# Patient Record
Sex: MALE | Race: WHITE | NOT HISPANIC OR LATINO
[De-identification: names, ages, dates, MRNs, and addresses within clinical notes are randomized per-mention and may not be internally consistent; named-entity substitution may affect disease eponyms.]

---

## 2019-09-10 PROBLEM — Z00.00 ENCOUNTER FOR PREVENTIVE HEALTH EXAMINATION: Status: ACTIVE | Noted: 2019-09-10

## 2019-09-20 ENCOUNTER — APPOINTMENT (OUTPATIENT)
Dept: UROLOGY | Facility: CLINIC | Age: 64
End: 2019-09-20
Payer: COMMERCIAL

## 2019-09-20 VITALS
WEIGHT: 200 LBS | DIASTOLIC BLOOD PRESSURE: 81 MMHG | HEIGHT: 70 IN | HEART RATE: 71 BPM | SYSTOLIC BLOOD PRESSURE: 130 MMHG | TEMPERATURE: 98 F | BODY MASS INDEX: 28.63 KG/M2

## 2019-09-20 DIAGNOSIS — N28.89 OTHER SPECIFIED DISORDERS OF KIDNEY AND URETER: ICD-10-CM

## 2019-09-20 PROCEDURE — 99204 OFFICE O/P NEW MOD 45 MIN: CPT

## 2019-09-20 NOTE — HISTORY OF PRESENT ILLNESS
[FreeTextEntry1] : CC: right renal mass\par \par Patient with gallstones, pain, led to ERcp and lap cholecystectomy, incidental right upper pole renal mass. Films reviewed on Quinten Mcnamara.   4.4 cm, upper pole, solid and enhancing.  RCC until proven otherwise.  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  \par \par SURG: lap jorge alberto 6 months ago\par ROS: negative 10 system \par FAMHX: negative RCC\par SOCIAL: cigars, banker

## 2019-09-20 NOTE — ASSESSMENT
[FreeTextEntry1] : We reviewed the possible underlying histology of solid enhancing renal masses, with the majority being malignant (~80%) whereas ~20% are benign (e.g. oncocytoma).  We discussed the role/possibility of percutaneous biopsy, with the associated risks, benefits, complications, and accuracy issues (e.g. risk of false negative results). \par \par The heterogeneous natural history/biology of renal cell carcinoma was discussed, including the fact that while many renal cancers are indolent, others behave aggresssively.  \par \par Options were reviewed including, not limited to, active surveillance (AS), surgical extirpation and ablation.  The risks of tumor growth and metastasis on active surveillance were reviewed, including the fact that metastatic progression on AS could mean missing the opportunity for cure.  The average growth rate of ~2-3 mm/year and metastasis rates of ~2-3% on AS over 5 year interval for small renal masses <4 cm was discussed. \par \par With respect to treatment we reviewed ablation (cryosurgery, radiofrequency ablation), risks of recurrence, opportunities for salvage treatment, and imaging requirements for follow up.  Oncologic outcomes for ablation were reviewed. \par \par With respect to surgery we reviewed nephron sparing surgery vs. radical nephrectomy, as well as open vs. minimally invasive surgical (MIS) approaches (e.g. laparoscopy and robotic assisted laparoscopic surgery).  Personal and institutional experience, as well as other published literature was reviewed.  Oncologic outcomes, renal functional outcomes were compared and contrasted between radical vs. NSS and open vs. MIS surgery.  Risks of acute kidney injury, medical/surgical chronic kidney disease (either exacerbation or new-onset), as well as risks of ESRD development were reviewed.   Risks of conversion from MIS to open surgery discussed.  Risks of converting from attempted partial nephrectomy to radical nephrectomy were discussed.  Risks of surgical complications were reviewed, including not limited to: bleeding//life-threatening hemorrhage, vascular/bowel/adjacent visceral organ injury, trocar/access injury, the possibility of recognized vs. unrecognized/delayed-recognition injury, risks of thermal/blunt/sharp/retraction injury, risks of DVT, PE, MI, death, risks of cardiopulmonary/anesthesia related complications, positional injury, infection/collection/abscess, wound complications/dehiscence/seroma/cellulitis, urinoma/fistula, ureteral injury/obstruction, as well as other complications\par \par Plan for robotic partial nephrectomy, possible radical possible open

## 2019-09-20 NOTE — PHYSICAL EXAM
[General Appearance - Well Developed] : well developed [General Appearance - Well Nourished] : well nourished [Normal Appearance] : normal appearance [Well Groomed] : well groomed [General Appearance - In No Acute Distress] : no acute distress [Edema] : no peripheral edema [] : no respiratory distress [Respiration, Rhythm And Depth] : normal respiratory rhythm and effort [Exaggerated Use Of Accessory Muscles For Inspiration] : no accessory muscle use [Abdomen Soft] : soft [Costovertebral Angle Tenderness] : no ~M costovertebral angle tenderness [Abdomen Tenderness] : non-tender [Penis Abnormality] : normal circumcised penis [Urethral Meatus] : meatus normal [Scrotum] : the scrotum was normal [Urinary Bladder Findings] : the bladder was normal on palpation [Epididymis] : the epididymides were normal [Testes Mass (___cm)] : there were no testicular masses [Testes Tenderness] : no tenderness of the testes [Normal Station and Gait] : the gait and station were normal for the patient's age [No Palpable Adenopathy] : no palpable adenopathy

## 2019-09-23 ENCOUNTER — TRANSCRIPTION ENCOUNTER (OUTPATIENT)
Age: 64
End: 2019-09-23

## 2019-11-05 ENCOUNTER — RECORD ABSTRACTING (OUTPATIENT)
Age: 64
End: 2019-11-05

## 2019-11-05 LAB
ALBUMIN SERPL ELPH-MCNC: 4.3 G/DL
ALP BLD-CCNC: 69 U/L
ALT SERPL-CCNC: 15 U/L
ANION GAP SERPL CALC-SCNC: 13 MMOL/L
AST SERPL-CCNC: 16 U/L
BILIRUB SERPL-MCNC: 0.4 MG/DL
BUN SERPL-MCNC: 16 MG/DL
CALCIUM SERPL-MCNC: 9.6 MG/DL
CHLORIDE SERPL-SCNC: 104 MMOL/L
CO2 SERPL-SCNC: 25 MMOL/L
CREAT SERPL-MCNC: 0.89 MG/DL
GLUCOSE SERPL-MCNC: 111 MG/DL
POTASSIUM SERPL-SCNC: 5.2 MMOL/L
PROT SERPL-MCNC: 6.5 G/DL
SODIUM SERPL-SCNC: 142 MMOL/L

## 2019-11-07 LAB
APPEARANCE: CLEAR
APTT BLD: 34 SEC
BACTERIA UR CULT: NORMAL
BACTERIA: NEGATIVE
BASOPHILS # BLD AUTO: 0.03 K/UL
BASOPHILS NFR BLD AUTO: 0.4 %
BILIRUBIN URINE: NEGATIVE
BLOOD URINE: NEGATIVE
COLOR: YELLOW
EOSINOPHIL # BLD AUTO: 0.1 K/UL
EOSINOPHIL NFR BLD AUTO: 1.4 %
GLUCOSE QUALITATIVE U: NEGATIVE
HCT VFR BLD CALC: 48.1 %
HGB BLD-MCNC: 15.2 G/DL
HYALINE CASTS: 1 /LPF
IMM GRANULOCYTES NFR BLD AUTO: 0.1 %
INR PPP: 0.97 RATIO
KETONES URINE: NEGATIVE
LEUKOCYTE ESTERASE URINE: NEGATIVE
LYMPHOCYTES # BLD AUTO: 2.39 K/UL
LYMPHOCYTES NFR BLD AUTO: 33.5 %
MAN DIFF?: NORMAL
MCHC RBC-ENTMCNC: 27.9 PG
MCHC RBC-ENTMCNC: 31.6 GM/DL
MCV RBC AUTO: 88.3 FL
MICROSCOPIC-UA: NORMAL
MONOCYTES # BLD AUTO: 0.52 K/UL
MONOCYTES NFR BLD AUTO: 7.3 %
NEUTROPHILS # BLD AUTO: 4.08 K/UL
NEUTROPHILS NFR BLD AUTO: 57.3 %
NITRITE URINE: NEGATIVE
PH URINE: 7
PLATELET # BLD AUTO: 208 K/UL
PROTEIN URINE: NEGATIVE
PT BLD: 11 SEC
RBC # BLD: 5.45 M/UL
RBC # FLD: 13.3 %
RED BLOOD CELLS URINE: 3 /HPF
SPECIFIC GRAVITY URINE: 1.02
SQUAMOUS EPITHELIAL CELLS: 1 /HPF
UROBILINOGEN URINE: NORMAL
WBC # FLD AUTO: 7.13 K/UL
WHITE BLOOD CELLS URINE: 1 /HPF

## 2019-11-18 ENCOUNTER — RESULT REVIEW (OUTPATIENT)
Age: 64
End: 2019-11-18

## 2019-11-18 ENCOUNTER — INPATIENT (INPATIENT)
Facility: HOSPITAL | Age: 64
LOS: 1 days | Discharge: HOME | DRG: 661 | End: 2019-11-20
Attending: UROLOGY | Admitting: UROLOGY
Payer: COMMERCIAL

## 2019-11-18 ENCOUNTER — APPOINTMENT (OUTPATIENT)
Dept: UROLOGY | Facility: HOSPITAL | Age: 64
End: 2019-11-18

## 2019-11-18 VITALS
WEIGHT: 210.54 LBS | HEIGHT: 70 IN | DIASTOLIC BLOOD PRESSURE: 92 MMHG | OXYGEN SATURATION: 95 % | HEART RATE: 77 BPM | RESPIRATION RATE: 16 BRPM | SYSTOLIC BLOOD PRESSURE: 154 MMHG | TEMPERATURE: 98 F

## 2019-11-18 DIAGNOSIS — I10 ESSENTIAL (PRIMARY) HYPERTENSION: ICD-10-CM

## 2019-11-18 DIAGNOSIS — Z98.890 OTHER SPECIFIED POSTPROCEDURAL STATES: Chronic | ICD-10-CM

## 2019-11-18 DIAGNOSIS — N28.89 OTHER SPECIFIED DISORDERS OF KIDNEY AND URETER: ICD-10-CM

## 2019-11-18 DIAGNOSIS — Z90.49 ACQUIRED ABSENCE OF OTHER SPECIFIED PARTS OF DIGESTIVE TRACT: Chronic | ICD-10-CM

## 2019-11-18 LAB
ANION GAP SERPL CALC-SCNC: 8 MMOL/L — SIGNIFICANT CHANGE UP (ref 5–17)
BASOPHILS # BLD AUTO: 0.03 K/UL — SIGNIFICANT CHANGE UP (ref 0–0.2)
BASOPHILS NFR BLD AUTO: 0.2 % — SIGNIFICANT CHANGE UP (ref 0–2)
BUN SERPL-MCNC: 20 MG/DL — SIGNIFICANT CHANGE UP (ref 7–23)
CALCIUM SERPL-MCNC: 8.9 MG/DL — SIGNIFICANT CHANGE UP (ref 8.4–10.5)
CHLORIDE SERPL-SCNC: 110 MMOL/L — HIGH (ref 96–108)
CO2 SERPL-SCNC: 24 MMOL/L — SIGNIFICANT CHANGE UP (ref 22–31)
CREAT SERPL-MCNC: 1 MG/DL — SIGNIFICANT CHANGE UP (ref 0.5–1.3)
EOSINOPHIL # BLD AUTO: 0.01 K/UL — SIGNIFICANT CHANGE UP (ref 0–0.5)
EOSINOPHIL NFR BLD AUTO: 0.1 % — SIGNIFICANT CHANGE UP (ref 0–6)
GLUCOSE SERPL-MCNC: 141 MG/DL — HIGH (ref 70–99)
HCT VFR BLD CALC: 39.2 % — SIGNIFICANT CHANGE UP (ref 39–50)
HGB BLD-MCNC: 12.5 G/DL — LOW (ref 13–17)
IMM GRANULOCYTES NFR BLD AUTO: 0.5 % — SIGNIFICANT CHANGE UP (ref 0–1.5)
LYMPHOCYTES # BLD AUTO: 0.84 K/UL — LOW (ref 1–3.3)
LYMPHOCYTES # BLD AUTO: 6.5 % — LOW (ref 13–44)
MCHC RBC-ENTMCNC: 27.5 PG — SIGNIFICANT CHANGE UP (ref 27–34)
MCHC RBC-ENTMCNC: 31.9 GM/DL — LOW (ref 32–36)
MCV RBC AUTO: 86.3 FL — SIGNIFICANT CHANGE UP (ref 80–100)
MONOCYTES # BLD AUTO: 0.61 K/UL — SIGNIFICANT CHANGE UP (ref 0–0.9)
MONOCYTES NFR BLD AUTO: 4.7 % — SIGNIFICANT CHANGE UP (ref 2–14)
NEUTROPHILS # BLD AUTO: 11.35 K/UL — HIGH (ref 1.8–7.4)
NEUTROPHILS NFR BLD AUTO: 88 % — HIGH (ref 43–77)
NRBC # BLD: 0 /100 WBCS — SIGNIFICANT CHANGE UP (ref 0–0)
PLATELET # BLD AUTO: 193 K/UL — SIGNIFICANT CHANGE UP (ref 150–400)
POTASSIUM SERPL-MCNC: 4.4 MMOL/L — SIGNIFICANT CHANGE UP (ref 3.5–5.3)
POTASSIUM SERPL-SCNC: 4.4 MMOL/L — SIGNIFICANT CHANGE UP (ref 3.5–5.3)
RBC # BLD: 4.54 M/UL — SIGNIFICANT CHANGE UP (ref 4.2–5.8)
RBC # FLD: 12.9 % — SIGNIFICANT CHANGE UP (ref 10.3–14.5)
SODIUM SERPL-SCNC: 142 MMOL/L — SIGNIFICANT CHANGE UP (ref 135–145)
WBC # BLD: 12.9 K/UL — HIGH (ref 3.8–10.5)
WBC # FLD AUTO: 12.9 K/UL — HIGH (ref 3.8–10.5)

## 2019-11-18 PROCEDURE — 50543 LAPARO PARTIAL NEPHRECTOMY: CPT | Mod: RT

## 2019-11-18 PROCEDURE — 71045 X-RAY EXAM CHEST 1 VIEW: CPT | Mod: 26

## 2019-11-18 PROCEDURE — S2900 ROBOTIC SURGICAL SYSTEM: CPT

## 2019-11-18 PROCEDURE — 76998 US GUIDE INTRAOP: CPT | Mod: 26

## 2019-11-18 RX ORDER — GABAPENTIN 400 MG/1
600 CAPSULE ORAL ONCE
Refills: 0 | Status: COMPLETED | OUTPATIENT
Start: 2019-11-18 | End: 2019-11-18

## 2019-11-18 RX ORDER — SENNA PLUS 8.6 MG/1
2 TABLET ORAL AT BEDTIME
Refills: 0 | Status: DISCONTINUED | OUTPATIENT
Start: 2019-11-18 | End: 2019-11-20

## 2019-11-18 RX ORDER — ONDANSETRON 8 MG/1
4 TABLET, FILM COATED ORAL EVERY 6 HOURS
Refills: 0 | Status: DISCONTINUED | OUTPATIENT
Start: 2019-11-18 | End: 2019-11-20

## 2019-11-18 RX ORDER — KETOROLAC TROMETHAMINE 30 MG/ML
15 SYRINGE (ML) INJECTION EVERY 6 HOURS
Refills: 0 | Status: DISCONTINUED | OUTPATIENT
Start: 2019-11-18 | End: 2019-11-20

## 2019-11-18 RX ORDER — OXYCODONE HYDROCHLORIDE 5 MG/1
5 TABLET ORAL EVERY 4 HOURS
Refills: 0 | Status: DISCONTINUED | OUTPATIENT
Start: 2019-11-18 | End: 2019-11-20

## 2019-11-18 RX ORDER — OXYCODONE HYDROCHLORIDE 5 MG/1
10 TABLET ORAL EVERY 4 HOURS
Refills: 0 | Status: DISCONTINUED | OUTPATIENT
Start: 2019-11-18 | End: 2019-11-20

## 2019-11-18 RX ORDER — ACETAMINOPHEN 500 MG
1000 TABLET ORAL ONCE
Refills: 0 | Status: COMPLETED | OUTPATIENT
Start: 2019-11-18 | End: 2019-11-18

## 2019-11-18 RX ORDER — AMLODIPINE BESYLATE 2.5 MG/1
1 TABLET ORAL
Qty: 0 | Refills: 0 | DISCHARGE

## 2019-11-18 RX ORDER — HYDROMORPHONE HYDROCHLORIDE 2 MG/ML
0.5 INJECTION INTRAMUSCULAR; INTRAVENOUS; SUBCUTANEOUS
Refills: 0 | Status: DISCONTINUED | OUTPATIENT
Start: 2019-11-18 | End: 2019-11-20

## 2019-11-18 RX ORDER — BUPIVACAINE 13.3 MG/ML
20 INJECTION, SUSPENSION, LIPOSOMAL INFILTRATION ONCE
Refills: 0 | Status: DISCONTINUED | OUTPATIENT
Start: 2019-11-18 | End: 2019-11-20

## 2019-11-18 RX ORDER — ACETAMINOPHEN 500 MG
1000 TABLET ORAL EVERY 6 HOURS
Refills: 0 | Status: DISCONTINUED | OUTPATIENT
Start: 2019-11-18 | End: 2019-11-20

## 2019-11-18 RX ORDER — CEFAZOLIN SODIUM 1 G
1000 VIAL (EA) INJECTION EVERY 8 HOURS
Refills: 0 | Status: COMPLETED | OUTPATIENT
Start: 2019-11-18 | End: 2019-11-19

## 2019-11-18 RX ORDER — LOSARTAN POTASSIUM 100 MG/1
25 TABLET, FILM COATED ORAL DAILY
Refills: 0 | Status: DISCONTINUED | OUTPATIENT
Start: 2019-11-18 | End: 2019-11-20

## 2019-11-18 RX ORDER — METOCLOPRAMIDE HCL 10 MG
10 TABLET ORAL ONCE
Refills: 0 | Status: DISCONTINUED | OUTPATIENT
Start: 2019-11-18 | End: 2019-11-20

## 2019-11-18 RX ORDER — ENOXAPARIN SODIUM 100 MG/ML
40 INJECTION SUBCUTANEOUS ONCE
Refills: 0 | Status: COMPLETED | OUTPATIENT
Start: 2019-11-18 | End: 2019-11-18

## 2019-11-18 RX ORDER — SODIUM CHLORIDE 9 MG/ML
1000 INJECTION, SOLUTION INTRAVENOUS
Refills: 0 | Status: DISCONTINUED | OUTPATIENT
Start: 2019-11-18 | End: 2019-11-19

## 2019-11-18 RX ORDER — ENOXAPARIN SODIUM 100 MG/ML
40 INJECTION SUBCUTANEOUS DAILY
Refills: 0 | Status: DISCONTINUED | OUTPATIENT
Start: 2019-11-18 | End: 2019-11-20

## 2019-11-18 RX ORDER — LOSARTAN POTASSIUM 100 MG/1
1 TABLET, FILM COATED ORAL
Qty: 0 | Refills: 0 | DISCHARGE

## 2019-11-18 RX ADMIN — Medication 15 MILLIGRAM(S): at 19:50

## 2019-11-18 RX ADMIN — Medication 15 MILLIGRAM(S): at 20:50

## 2019-11-18 RX ADMIN — Medication 1000 MILLIGRAM(S): at 07:37

## 2019-11-18 RX ADMIN — LOSARTAN POTASSIUM 25 MILLIGRAM(S): 100 TABLET, FILM COATED ORAL at 17:02

## 2019-11-18 RX ADMIN — SODIUM CHLORIDE 75 MILLILITER(S): 9 INJECTION, SOLUTION INTRAVENOUS at 17:02

## 2019-11-18 RX ADMIN — Medication 1000 MILLIGRAM(S): at 20:50

## 2019-11-18 RX ADMIN — GABAPENTIN 600 MILLIGRAM(S): 400 CAPSULE ORAL at 07:38

## 2019-11-18 RX ADMIN — Medication 1000 MILLIGRAM(S): at 19:50

## 2019-11-18 RX ADMIN — Medication 100 MILLIGRAM(S): at 17:02

## 2019-11-18 RX ADMIN — ENOXAPARIN SODIUM 40 MILLIGRAM(S): 100 INJECTION SUBCUTANEOUS at 07:37

## 2019-11-18 NOTE — PACU DISCHARGE NOTE - COMMENTS
axo3 denies pain Lap sites intact Right abd artem draining small amount of serosanginous drainage navarro to gravity draining patrick urine report given to unit nurse for transfer in bed to room 545/1

## 2019-11-18 NOTE — PROGRESS NOTE ADULT - PROBLEM SELECTOR PLAN 1
-stable  -OOB  -IS, SCD's  -Diet: clears  -Antibx: ancef x 3  -I's & O's  -pain control  -IVF's  -continue melanie & JIMENA

## 2019-11-18 NOTE — PROGRESS NOTE ADULT - SUBJECTIVE AND OBJECTIVE BOX
UROLOGY POST OP NOTE (PAGER # [phone])    PROCEDURE: Robotic assisted Right nephrectomy    T(C): 36.8 (11-18-19 @ 15:51), Max: 37.4 (11-18-19 @ 13:26)  HR: 86 (11-18-19 @ 15:51) (77 - 88)  BP: 161/93 (11-18-19 @ 15:51) (140/84 - 161/93)  RR: 18 (11-18-19 @ 15:51) (10 - 20)  SpO2: 94% (11-18-19 @ 15:51) (94% - 98%)  Wt(kg): --   cc  JIMENA output: 45 cc serosanguinous    SUBJECTIVE: pain controlled. No N/V, No SOB/CP.    ON PE: alert and awake    Abdomen: soft, incision sites clean and dry, sl TTP, JIMENA intact serosanguinous    : FC intact, urine clear                          12.5   12.90 )-----------( 193      ( 18 Nov 2019 13:43 )             39.2     11-18    142  |  110<H>  |  20  ----------------------------<  141<H>  4.4   |  24  |  1.00    Ca    8.9      18 Nov 2019 13:43

## 2019-11-19 LAB
ANION GAP SERPL CALC-SCNC: 10 MMOL/L — SIGNIFICANT CHANGE UP (ref 5–17)
BUN SERPL-MCNC: 20 MG/DL — SIGNIFICANT CHANGE UP (ref 7–23)
CALCIUM SERPL-MCNC: 8.1 MG/DL — LOW (ref 8.4–10.5)
CHLORIDE SERPL-SCNC: 107 MMOL/L — SIGNIFICANT CHANGE UP (ref 96–108)
CO2 SERPL-SCNC: 23 MMOL/L — SIGNIFICANT CHANGE UP (ref 22–31)
CREAT FLD-MCNC: 1.37 MG/DL — SIGNIFICANT CHANGE UP
CREAT SERPL-MCNC: 1.44 MG/DL — HIGH (ref 0.5–1.3)
GLUCOSE SERPL-MCNC: 106 MG/DL — HIGH (ref 70–99)
HCT VFR BLD CALC: 36.7 % — LOW (ref 39–50)
HGB BLD-MCNC: 11.6 G/DL — LOW (ref 13–17)
MAGNESIUM SERPL-MCNC: 1.7 MG/DL — SIGNIFICANT CHANGE UP (ref 1.6–2.6)
MCHC RBC-ENTMCNC: 28 PG — SIGNIFICANT CHANGE UP (ref 27–34)
MCHC RBC-ENTMCNC: 31.6 GM/DL — LOW (ref 32–36)
MCV RBC AUTO: 88.4 FL — SIGNIFICANT CHANGE UP (ref 80–100)
NRBC # BLD: 0 /100 WBCS — SIGNIFICANT CHANGE UP (ref 0–0)
PHOSPHATE SERPL-MCNC: 3.9 MG/DL — SIGNIFICANT CHANGE UP (ref 2.5–4.5)
PLATELET # BLD AUTO: 185 K/UL — SIGNIFICANT CHANGE UP (ref 150–400)
POTASSIUM SERPL-MCNC: 4.3 MMOL/L — SIGNIFICANT CHANGE UP (ref 3.5–5.3)
POTASSIUM SERPL-SCNC: 4.3 MMOL/L — SIGNIFICANT CHANGE UP (ref 3.5–5.3)
RBC # BLD: 4.15 M/UL — LOW (ref 4.2–5.8)
RBC # FLD: 12.8 % — SIGNIFICANT CHANGE UP (ref 10.3–14.5)
SODIUM SERPL-SCNC: 140 MMOL/L — SIGNIFICANT CHANGE UP (ref 135–145)
WBC # BLD: 10.33 K/UL — SIGNIFICANT CHANGE UP (ref 3.8–10.5)
WBC # FLD AUTO: 10.33 K/UL — SIGNIFICANT CHANGE UP (ref 3.8–10.5)

## 2019-11-19 RX ORDER — MAGNESIUM OXIDE 400 MG ORAL TABLET 241.3 MG
400 TABLET ORAL ONCE
Refills: 0 | Status: COMPLETED | OUTPATIENT
Start: 2019-11-19 | End: 2019-11-19

## 2019-11-19 RX ADMIN — Medication 15 MILLIGRAM(S): at 14:22

## 2019-11-19 RX ADMIN — Medication 100 MILLIGRAM(S): at 02:09

## 2019-11-19 RX ADMIN — Medication 1000 MILLIGRAM(S): at 13:14

## 2019-11-19 RX ADMIN — Medication 15 MILLIGRAM(S): at 02:10

## 2019-11-19 RX ADMIN — Medication 1000 MILLIGRAM(S): at 08:50

## 2019-11-19 RX ADMIN — Medication 15 MILLIGRAM(S): at 19:00

## 2019-11-19 RX ADMIN — Medication 15 MILLIGRAM(S): at 07:47

## 2019-11-19 RX ADMIN — Medication 100 MILLIGRAM(S): at 10:13

## 2019-11-19 RX ADMIN — Medication 1000 MILLIGRAM(S): at 03:10

## 2019-11-19 RX ADMIN — Medication 1000 MILLIGRAM(S): at 07:47

## 2019-11-19 RX ADMIN — Medication 1000 MILLIGRAM(S): at 14:22

## 2019-11-19 RX ADMIN — Medication 15 MILLIGRAM(S): at 13:14

## 2019-11-19 RX ADMIN — MAGNESIUM OXIDE 400 MG ORAL TABLET 400 MILLIGRAM(S): 241.3 TABLET ORAL at 08:50

## 2019-11-19 RX ADMIN — Medication 1000 MILLIGRAM(S): at 02:10

## 2019-11-19 RX ADMIN — Medication 1000 MILLIGRAM(S): at 20:00

## 2019-11-19 RX ADMIN — ENOXAPARIN SODIUM 40 MILLIGRAM(S): 100 INJECTION SUBCUTANEOUS at 06:29

## 2019-11-19 RX ADMIN — Medication 1000 MILLIGRAM(S): at 19:00

## 2019-11-19 RX ADMIN — Medication 15 MILLIGRAM(S): at 08:50

## 2019-11-19 RX ADMIN — LOSARTAN POTASSIUM 25 MILLIGRAM(S): 100 TABLET, FILM COATED ORAL at 06:29

## 2019-11-19 RX ADMIN — Medication 15 MILLIGRAM(S): at 20:00

## 2019-11-19 RX ADMIN — Medication 15 MILLIGRAM(S): at 02:25

## 2019-11-19 NOTE — PROGRESS NOTE ADULT - SUBJECTIVE AND OBJECTIVE BOX
INTERVAL HPI/OVERNIGHT EVENTS:  No acute events overnight.    VITALS:    T(F): 98.1 (11-19-19 @ 00:36), Max: 99.3 (11-18-19 @ 13:26)  HR: 78 (11-19-19 @ 00:36) (77 - 88)  BP: 147/89 (11-19-19 @ 00:36) (140/84 - 161/93)  RR: 18 (11-19-19 @ 00:36) (10 - 20)  SpO2: 95% (11-19-19 @ 00:36) (94% - 98%)  Wt(kg): --    I&O's Detail    18 Nov 2019 07:01  -  19 Nov 2019 04:56  --------------------------------------------------------  IN:    lactated ringers.: 1125 mL  Total IN: 1125 mL    OUT:    Bulb: 215 mL    Indwelling Catheter - Urethral: 1550 mL  Total OUT: 1765 mL    Total NET: -640 mL          MEDICATIONS:    ANTIBIOTICS:  ceFAZolin   IVPB 1000 milliGRAM(s) IV Intermittent every 8 hours      PAIN CONTROL:  acetaminophen   Tablet .. 1000 milliGRAM(s) Oral every 6 hours  HYDROmorphone  Injectable 0.5 milliGRAM(s) IV Push every 3 hours PRN  HYDROmorphone  Injectable 0.5 milliGRAM(s) IV Push every 10 minutes PRN  ketorolac   Injectable 15 milliGRAM(s) IV Push every 6 hours  metoclopramide Injectable 10 milliGRAM(s) IV Push once PRN  ondansetron Injectable 4 milliGRAM(s) IV Push every 6 hours PRN  oxyCODONE    IR 5 milliGRAM(s) Oral every 4 hours PRN  oxyCODONE    IR 10 milliGRAM(s) Oral every 4 hours PRN       MEDS:      HEME/ONC  enoxaparin Injectable 40 milliGRAM(s) SubCutaneous daily        PHYSICAL EXAM:  General: No acute distress.  Alert and Oriented  Abdominal Exam: soft, incision sites clean and dry, JIMENA intact serosanguinous   Exam: FC intact, urine clear      LABS:                        12.5   12.90 )-----------( 193      ( 18 Nov 2019 13:43 )             39.2     11-18    142  |  110<H>  |  20  ----------------------------<  141<H>  4.4   |  24  |  1.00    Ca    8.9      18 Nov 2019 13:43            RADIOLOGY & ADDITIONAL TESTS:

## 2019-11-20 ENCOUNTER — TRANSCRIPTION ENCOUNTER (OUTPATIENT)
Age: 64
End: 2019-11-20

## 2019-11-20 VITALS
DIASTOLIC BLOOD PRESSURE: 87 MMHG | OXYGEN SATURATION: 96 % | SYSTOLIC BLOOD PRESSURE: 142 MMHG | HEART RATE: 81 BPM | TEMPERATURE: 98 F | RESPIRATION RATE: 17 BRPM

## 2019-11-20 PROBLEM — N28.89 OTHER SPECIFIED DISORDERS OF KIDNEY AND URETER: Chronic | Status: ACTIVE | Noted: 2019-11-18

## 2019-11-20 PROBLEM — I10 ESSENTIAL (PRIMARY) HYPERTENSION: Chronic | Status: ACTIVE | Noted: 2019-11-18

## 2019-11-20 LAB
ANION GAP SERPL CALC-SCNC: 9 MMOL/L — SIGNIFICANT CHANGE UP (ref 5–17)
BUN SERPL-MCNC: 22 MG/DL — SIGNIFICANT CHANGE UP (ref 7–23)
CALCIUM SERPL-MCNC: 8.4 MG/DL — SIGNIFICANT CHANGE UP (ref 8.4–10.5)
CHLORIDE SERPL-SCNC: 108 MMOL/L — SIGNIFICANT CHANGE UP (ref 96–108)
CO2 SERPL-SCNC: 25 MMOL/L — SIGNIFICANT CHANGE UP (ref 22–31)
CREAT SERPL-MCNC: 1.44 MG/DL — HIGH (ref 0.5–1.3)
GLUCOSE SERPL-MCNC: 98 MG/DL — SIGNIFICANT CHANGE UP (ref 70–99)
HCT VFR BLD CALC: 34.9 % — LOW (ref 39–50)
HGB BLD-MCNC: 11.4 G/DL — LOW (ref 13–17)
MAGNESIUM SERPL-MCNC: 2 MG/DL — SIGNIFICANT CHANGE UP (ref 1.6–2.6)
MCHC RBC-ENTMCNC: 28 PG — SIGNIFICANT CHANGE UP (ref 27–34)
MCHC RBC-ENTMCNC: 32.7 GM/DL — SIGNIFICANT CHANGE UP (ref 32–36)
MCV RBC AUTO: 85.7 FL — SIGNIFICANT CHANGE UP (ref 80–100)
NRBC # BLD: 0 /100 WBCS — SIGNIFICANT CHANGE UP (ref 0–0)
PHOSPHATE SERPL-MCNC: 2.6 MG/DL — SIGNIFICANT CHANGE UP (ref 2.5–4.5)
PLATELET # BLD AUTO: 178 K/UL — SIGNIFICANT CHANGE UP (ref 150–400)
POTASSIUM SERPL-MCNC: 4.7 MMOL/L — SIGNIFICANT CHANGE UP (ref 3.5–5.3)
POTASSIUM SERPL-SCNC: 4.7 MMOL/L — SIGNIFICANT CHANGE UP (ref 3.5–5.3)
RBC # BLD: 4.07 M/UL — LOW (ref 4.2–5.8)
RBC # FLD: 12.8 % — SIGNIFICANT CHANGE UP (ref 10.3–14.5)
SODIUM SERPL-SCNC: 142 MMOL/L — SIGNIFICANT CHANGE UP (ref 135–145)
WBC # BLD: 8.94 K/UL — SIGNIFICANT CHANGE UP (ref 3.8–10.5)
WBC # FLD AUTO: 8.94 K/UL — SIGNIFICANT CHANGE UP (ref 3.8–10.5)

## 2019-11-20 RX ORDER — DOCUSATE SODIUM 100 MG
1 CAPSULE ORAL
Qty: 60 | Refills: 0
Start: 2019-11-20 | End: 2019-12-19

## 2019-11-20 RX ADMIN — Medication 1000 MILLIGRAM(S): at 00:44

## 2019-11-20 RX ADMIN — Medication 15 MILLIGRAM(S): at 06:16

## 2019-11-20 RX ADMIN — Medication 1000 MILLIGRAM(S): at 01:44

## 2019-11-20 RX ADMIN — Medication 15 MILLIGRAM(S): at 01:00

## 2019-11-20 RX ADMIN — Medication 1000 MILLIGRAM(S): at 07:01

## 2019-11-20 RX ADMIN — LOSARTAN POTASSIUM 25 MILLIGRAM(S): 100 TABLET, FILM COATED ORAL at 06:00

## 2019-11-20 RX ADMIN — Medication 15 MILLIGRAM(S): at 00:45

## 2019-11-20 RX ADMIN — Medication 1000 MILLIGRAM(S): at 06:01

## 2019-11-20 RX ADMIN — Medication 15 MILLIGRAM(S): at 06:01

## 2019-11-20 NOTE — DISCHARGE NOTE PROVIDER - NSDCCPCAREPLAN_GEN_ALL_CORE_FT
PRINCIPAL DISCHARGE DIAGNOSIS  Diagnosis: Renal mass, right  Assessment and Plan of Treatment: Call MD for increase abdominal pain, nausea, vomiting, temperature >101.4F, or for difficulty voiding.  Follow up with Dr. Downing in one week.  Resume home medications.

## 2019-11-20 NOTE — PROGRESS NOTE ADULT - ASSESSMENT
65 yo male s/p robotic partial R nephrectomy,    Plan: -stable  -OOB  -SCD's, IS  -f/u labs  -continue present care

## 2019-11-20 NOTE — DISCHARGE NOTE PROVIDER - NSDCMRMEDTOKEN_GEN_ALL_CORE_FT
Colace 100 mg oral capsule: 1 cap(s) orally 2 times a day MDD:2  losartan 25 mg oral tablet: 1 tab(s) orally once a day  Percocet 5/325 oral tablet: 1 tab(s) orally every 6 hours, As Needed -for moderate pain MDD:4

## 2019-11-20 NOTE — DISCHARGE NOTE PROVIDER - CARE PROVIDER_API CALL
Luis Downing)  Urology  [address] [address]  Phone: [phone]  Fax: [phone]  Follow Up Time:

## 2019-11-20 NOTE — PROGRESS NOTE ADULT - SUBJECTIVE AND OBJECTIVE BOX
INTERVAL HPI/OVERNIGHT EVENTS:  No acute events overnight.    VITALS:    T(F): 98 (11-20-19 @ 05:56), Max: 98.5 (11-20-19 @ 01:00)  HR: 91 (11-20-19 @ 05:56) (70 - 91)  BP: 154/90 (11-20-19 @ 05:56) (110/71 - 155/86)  RR: 16 (11-20-19 @ 05:56) (16 - 17)  SpO2: 97% (11-20-19 @ 05:56) (94% - 97%)  Wt(kg): --    I&O's Detail    19 Nov 2019 07:01  -  20 Nov 2019 07:00  --------------------------------------------------------  IN:    lactated ringers.: 225 mL  Total IN: 225 mL    OUT:    Bulb: 147.5 mL    Indwelling Catheter - Urethral: 275 mL    Voided: 500 mL  Total OUT: 922.5 mL    Total NET: -697.5 mL          MEDICATIONS:    ANTIBIOTICS:      PAIN CONTROL:  acetaminophen   Tablet .. 1000 milliGRAM(s) Oral every 6 hours  HYDROmorphone  Injectable 0.5 milliGRAM(s) IV Push every 3 hours PRN  HYDROmorphone  Injectable 0.5 milliGRAM(s) IV Push every 10 minutes PRN  ketorolac   Injectable 15 milliGRAM(s) IV Push every 6 hours  metoclopramide Injectable 10 milliGRAM(s) IV Push once PRN  ondansetron Injectable 4 milliGRAM(s) IV Push every 6 hours PRN  oxyCODONE    IR 5 milliGRAM(s) Oral every 4 hours PRN  oxyCODONE    IR 10 milliGRAM(s) Oral every 4 hours PRN       MEDS:      HEME/ONC  enoxaparin Injectable 40 milliGRAM(s) SubCutaneous daily        PHYSICAL EXAM:  General: No acute distress.  Alert and Oriented  Abdominal Exam: soft, incision sites clean and dry, JIMENA intact serosanguinous   Exam: WNL, urine clear      LABS:                        11.4   8.94  )-----------( 178      ( 20 Nov 2019 06:09 )             34.9     11-20    142  |  108  |  22  ----------------------------<  98  4.7   |  25  |  1.44<H>    Ca    8.4      20 Nov 2019 06:09  Phos  2.6     11-20  Mg     2.0     11-20            RADIOLOGY & ADDITIONAL TESTS:

## 2019-11-20 NOTE — DISCHARGE NOTE PROVIDER - NSDCACTIVITY_GEN_ALL_CORE
No heavy lifting/straining/Stairs allowed/Walking - Outdoors allowed/Walking - Indoors allowed/Showering allowed

## 2019-11-20 NOTE — DISCHARGE NOTE PROVIDER - HOSPITAL COURSE
65 yo male with R kidney mass s/p OR 11/18 robotic assisted R partial nephrectomy. Tolerated procedure well. Uneventful post op course.    Tolerates PO. Voiding well. JIMENA drain removed prior to d/c. F/U as outpt.

## 2019-11-26 ENCOUNTER — APPOINTMENT (OUTPATIENT)
Dept: UROLOGY | Facility: CLINIC | Age: 64
End: 2019-11-26
Payer: COMMERCIAL

## 2019-11-26 LAB
ANION GAP SERPL CALC-SCNC: 9 MMOL/L
BUN SERPL-MCNC: 14 MG/DL
CALCIUM SERPL-MCNC: 9 MG/DL
CHLORIDE SERPL-SCNC: 103 MMOL/L
CO2 SERPL-SCNC: 28 MMOL/L
CREAT SERPL-MCNC: 1.37 MG/DL
GLUCOSE SERPL-MCNC: 96 MG/DL
POTASSIUM SERPL-SCNC: 5 MMOL/L
SODIUM SERPL-SCNC: 140 MMOL/L

## 2019-11-26 PROCEDURE — 99024 POSTOP FOLLOW-UP VISIT: CPT

## 2019-11-26 NOTE — HISTORY OF PRESENT ILLNESS
[FreeTextEntry1] : Patient returns s/p partial nephrectomy and is doing well after partial nephrectomy\par \par Patient without complaints; specifically denies nausea, vomiting, fever. \par  \par Tolerating a regular diet, passing gas and BM\par \par Urine clear and yellow. \par \par No incisional complaints.  He only states that the first post-operative days it was difficult to cough or sneeze due to some incisional pain. He is back to work.  We operated last Monday, discharged Wednesday, and back to work Friday.  He works in banking. \par \par No leg swelling or calf pain, no shortness of breath, pleuritic pain, or chest pain. \par \par Pathology pending; oncocytoma, chromophobe, vs other pathology.  Special stains pending.  Dr. Ibrahim believes margin negative but awaiting final pathology. .   \par \par Plan for follow up imaging per protocol.\par

## 2019-11-27 LAB — SURGICAL PATHOLOGY STUDY: SIGNIFICANT CHANGE UP

## 2019-12-03 PROCEDURE — 80048 BASIC METABOLIC PNL TOTAL CA: CPT

## 2019-12-03 PROCEDURE — 88331 PATH CONSLTJ SURG 1 BLK 1SPC: CPT

## 2019-12-03 PROCEDURE — 85025 COMPLETE CBC W/AUTO DIFF WBC: CPT

## 2019-12-03 PROCEDURE — 84100 ASSAY OF PHOSPHORUS: CPT

## 2019-12-03 PROCEDURE — 36415 COLL VENOUS BLD VENIPUNCTURE: CPT

## 2019-12-03 PROCEDURE — 85027 COMPLETE CBC AUTOMATED: CPT

## 2019-12-03 PROCEDURE — 71045 X-RAY EXAM CHEST 1 VIEW: CPT

## 2019-12-03 PROCEDURE — S2900: CPT

## 2019-12-03 PROCEDURE — 88307 TISSUE EXAM BY PATHOLOGIST: CPT

## 2019-12-03 PROCEDURE — 82570 ASSAY OF URINE CREATININE: CPT

## 2019-12-03 PROCEDURE — 88305 TISSUE EXAM BY PATHOLOGIST: CPT

## 2019-12-03 PROCEDURE — 86923 COMPATIBILITY TEST ELECTRIC: CPT

## 2019-12-03 PROCEDURE — 86901 BLOOD TYPING SEROLOGIC RH(D): CPT

## 2019-12-03 PROCEDURE — 88341 IMHCHEM/IMCYTCHM EA ADD ANTB: CPT

## 2019-12-03 PROCEDURE — 83735 ASSAY OF MAGNESIUM: CPT

## 2019-12-03 PROCEDURE — 86900 BLOOD TYPING SEROLOGIC ABO: CPT

## 2019-12-03 PROCEDURE — 86850 RBC ANTIBODY SCREEN: CPT

## 2020-05-10 ENCOUNTER — TRANSCRIPTION ENCOUNTER (OUTPATIENT)
Age: 65
End: 2020-05-10

## 2020-07-20 ENCOUNTER — TRANSCRIPTION ENCOUNTER (OUTPATIENT)
Age: 65
End: 2020-07-20

## 2020-08-25 ENCOUNTER — APPOINTMENT (OUTPATIENT)
Dept: UROLOGY | Facility: CLINIC | Age: 65
End: 2020-08-25
Payer: COMMERCIAL

## 2020-08-25 VITALS
BODY MASS INDEX: 29.78 KG/M2 | SYSTOLIC BLOOD PRESSURE: 138 MMHG | DIASTOLIC BLOOD PRESSURE: 95 MMHG | TEMPERATURE: 98.1 F | OXYGEN SATURATION: 98 % | WEIGHT: 208 LBS | HEIGHT: 70 IN | HEART RATE: 88 BPM

## 2020-08-25 PROCEDURE — 99213 OFFICE O/P EST LOW 20 MIN: CPT

## 2020-08-25 NOTE — ASSESSMENT
[FreeTextEntry1] : Benign renal mass s/p excision\par MRI benign no concerning lesions\par No further follow up needed

## 2020-08-25 NOTE — PHYSICAL EXAM
[General Appearance - Well Developed] : well developed [General Appearance - Well Nourished] : well nourished [Well Groomed] : well groomed [Normal Appearance] : normal appearance [General Appearance - In No Acute Distress] : no acute distress [Abdomen Soft] : soft [Abdomen Tenderness] : non-tender [Urethral Meatus] : meatus normal [Costovertebral Angle Tenderness] : no ~M costovertebral angle tenderness [Urinary Bladder Findings] : the bladder was normal on palpation [Scrotum] : the scrotum was normal [Testes Mass (___cm)] : there were no testicular masses [] : no respiratory distress [No Prostate Nodules] : no prostate nodules [Edema] : no peripheral edema [Respiration, Rhythm And Depth] : normal respiratory rhythm and effort [Exaggerated Use Of Accessory Muscles For Inspiration] : no accessory muscle use

## 2020-08-25 NOTE — HISTORY OF PRESENT ILLNESS
[FreeTextEntry1] : CC: Benign renal mass\par \par Patient s/p partial nephrectomy, doing well\par Oncocytoma\par No complaints\par WIll obtain PSA screening with PMD\par No other issues. \par \par SURG: lap jorge alberto, partial nephrectomy \par ROS: negative 10 system \par FAMHX: negative RCC\par SOCIAL: cigars, banker \par

## 2021-01-14 ENCOUNTER — TRANSCRIPTION ENCOUNTER (OUTPATIENT)
Age: 66
End: 2021-01-14

## 2021-01-17 ENCOUNTER — OUTPATIENT (OUTPATIENT)
Dept: INPATIENT UNIT | Facility: HOSPITAL | Age: 66
LOS: 1 days | End: 2021-01-17
Payer: COMMERCIAL

## 2021-01-17 ENCOUNTER — APPOINTMENT (OUTPATIENT)
Dept: DISASTER EMERGENCY | Facility: HOSPITAL | Age: 66
End: 2021-01-17

## 2021-01-17 VITALS
OXYGEN SATURATION: 97 % | DIASTOLIC BLOOD PRESSURE: 92 MMHG | HEART RATE: 93 BPM | TEMPERATURE: 98 F | SYSTOLIC BLOOD PRESSURE: 131 MMHG | RESPIRATION RATE: 18 BRPM

## 2021-01-17 VITALS
HEART RATE: 82 BPM | TEMPERATURE: 98 F | DIASTOLIC BLOOD PRESSURE: 85 MMHG | RESPIRATION RATE: 19 BRPM | SYSTOLIC BLOOD PRESSURE: 119 MMHG | OXYGEN SATURATION: 98 %

## 2021-01-17 DIAGNOSIS — Z98.890 OTHER SPECIFIED POSTPROCEDURAL STATES: Chronic | ICD-10-CM

## 2021-01-17 DIAGNOSIS — Z90.49 ACQUIRED ABSENCE OF OTHER SPECIFIED PARTS OF DIGESTIVE TRACT: Chronic | ICD-10-CM

## 2021-01-17 DIAGNOSIS — U07.1 COVID-19: ICD-10-CM

## 2021-01-17 PROCEDURE — M0243: CPT

## 2021-01-17 NOTE — CHART NOTE - NSCHARTNOTEFT_GEN_A_CORE
CC: Monoclonal Antibody Infusion/COVID 19 Positive  65y Male    exam/findings:  T(C): --  HR: --  BP: --  RR: --  SpO2: --      PE:   Appearance: NAD	  HEENT:   Normal oral mucosa,   Lymphatic: No lymphadenopathy  Cardiovascular: Normal S1 S2, No JVD, No murmurs, No edema  Respiratory: Lungs clear to auscultation	  Gastrointestinal:  Soft, Non-tender, + BS	  Skin: warm and dry, no rash or urticaria   Neurologic: Non-focal  Extremities: Normal range of motion,    ASSESSMENT:  Pt is a 64 yo male with a PMHx of HTN   Covid + 1/13/2021 referred by Carolann Null who presents to infusion center for Monoclonal antibody infusion (Regeneron).    Symptoms/ Criteria: Cough, Congestion  Risk Profile includes: >64 yo    PLAN:  - infusion procedure explained to patient I have reviewed the Regeneron Emergency Use Authorization (EUA) and I have provided the patient or patient's caregiver with the following information:      1. FDA has authorized emergency use of Regeneron( Casirivimab and Imdevimab), which is not an FDA-approved biological product.  2. The patient or patient's caregiver has the option to accept or refuse administration of Regeneron.  3. The significant known and potential risks and benefits of Regeneron and the extent to which such risks and benefits are unknown.  4. Information on available alternative treatments were discussed and recommended to visit https://www.ebhwq79qjyokccpeatiefhkhkf.nih.gov/ for further understanding    - Consent for monoclonal antibody infusion obtained   - Risk & benefits discussed/all questions answered  - infuse Regeneron (casirivimab 1200mg & imdevimab 1200mg) IV over one hour  - observe patient for one hour post infusion, and then if stable discharge home with oupt follow up as planned by PMD. CC: Monoclonal Antibody Infusion/COVID 19 Positive  65y Male    Vital signs  BP- 131/92  HR- 93  TEMP-98.3  RR-18  SPO2-97%    PE:   Appearance: NAD	  HEENT:   Normal oral mucosa,   Lymphatic: No lymphadenopathy  Cardiovascular: Normal S1 S2, No JVD, No murmurs, No edema  Respiratory: Lungs clear to auscultation	  Gastrointestinal:  Soft, Non-tender, + BS	  Skin: warm and dry, no rash or urticaria   Neurologic: Non-focal  Extremities: Normal range of motion,    ASSESSMENT:  Pt is a 64 yo male with a PMHx of HTN   Covid + 1/13/2021 referred by Carolann Null who presents to infusion center for Monoclonal antibody infusion (Regeneron).    Symptoms/ Criteria: Cough, Congestion  Risk Profile includes: >66 yo    PLAN:  - infusion procedure explained to patient I have reviewed the Regeneron Emergency Use Authorization (EUA) and I have provided the patient or patient's caregiver with the following information:      1. FDA has authorized emergency use of Regeneron( Casirivimab and Imdevimab), which is not an FDA-approved biological product.  2. The patient or patient's caregiver has the option to accept or refuse administration of Regeneron.  3. The significant known and potential risks and benefits of Regeneron and the extent to which such risks and benefits are unknown.  4. Information on available alternative treatments were discussed and recommended to visit https://www.kevbb13nxnarlufnipzcxisyel.nih.gov/ for further understanding    - Consent for monoclonal antibody infusion obtained   - Risk & benefits discussed/all questions answered  - infuse Regeneron (casirivimab 1200mg & imdevimab 1200mg) IV over one hour  - observe patient for one hour post infusion, and then if stable discharge home with oupt follow up as planned by PMD.    ADDENDUM   Regeneron infusion and 1 hour post infusion protocol complete.  Pt is stable and offers no new complaints.  VSS/PE unchanged    Vital Signs Last 24 Hrs  T(C): 36.6 (17 Jan 2021 16:27), Max: 37.1 (17 Jan 2021 13:58)  T(F): 97.9 (17 Jan 2021 16:27), Max: 98.8 (17 Jan 2021 13:58)  HR: 82 (17 Jan 2021 16:27) (78 - 93)  BP: 119/85 (17 Jan 2021 16:27) (119/85 - 131/92)  BP(mean): --  RR: 19 (17 Jan 2021 16:27) (16 - 20)  SpO2: 98% (17 Jan 2021 16:27) (97% - 99%)          - Pt is stable for discharge  - Discharge instructions reviewed with pt, all questions answered

## 2021-01-18 ENCOUNTER — TRANSCRIPTION ENCOUNTER (OUTPATIENT)
Age: 66
End: 2021-01-18

## 2021-01-26 ENCOUNTER — TRANSCRIPTION ENCOUNTER (OUTPATIENT)
Age: 66
End: 2021-01-26

## 2021-10-17 ENCOUNTER — TRANSCRIPTION ENCOUNTER (OUTPATIENT)
Age: 66
End: 2021-10-17

## 2022-11-05 NOTE — PATIENT PROFILE ADULT - NSPROPOAURINARYCATHETER_GEN_A_NUR
Diagnosis; acute low back strain // sinus tachycardia     - activity as tolerated -- expect pain in back- the gaol is function and not 100 % pain free- muscular low back pain can last  for 2-3 weeks- but should resolve over time     - for pain- over the counter generic tylenol 500 mg every 4 hrs -- please do not take any over the counter generic ibuprofen- advil/motrin or naproxen- aleve    - flexeril- a muscle relaxant-can make you sleepy -- 1 tablet at night before bed    - only aS NEEDED FOR  SEVERE PAIN- OXYCODONE 1 TABLET     - YOU WILL NEED FOLLOW UP FOR YOUR MILDLY ELEVATED HEART RATE WHICH IS NOT NEW--  -- YOU WILL ALSO NEED AN OUTPATIENT ECHOCARDIOGRAM OF YOUR HEART- WHICH IS AN ULTRASOUND-CALL [facility] SCHEDULING ON Monday  [phone] TO SCHEDULE AN APPOINTMENT     - PLEASE RETURN TO  THE ER FOR ANY INCREASING SHORTNESS OF BREATH - ESPECIALLY INCREASING SHORTNESS OF BREATH WITH EXERTION - OR ANY CHEST PAIN -- -- ALSO RETURN TO  THE ER FOR ANY WORSENING / INTRACTABLE LOW BACK PAIN - IN WHICH YOU CAN NOT DO YOUR DAILY ACTIVITIES BECAUSE YOU ARE  IN TOO MUCH PAIN - ANY FOOT/LEG WEAKNESS/ANY LOSS OF SENSATION - ANY INABILITY TO MOVE OR CONTROL YOUR URINE/STOOLS no

## 2024-12-09 NOTE — DISCHARGE NOTE NURSING/CASE MANAGEMENT/SOCIAL WORK - PATIENT PORTAL LINK FT
Please let pt know results:    XR of shoulder shows only mild arthritis changes in the joint , there is some calcification in the rotator cuff ligament, not sure if previous surgery could have caused it.  Please try PT and see an orthopedist.  DR PARKINSON   You can access the FollowMyHealth Patient Portal offered by [facility] by registering at the following website: [email or web address]. By joining [facility]’s FollowMyHealth portal, you will also be able to view your health information using other applications (apps) compatible with our system.

## 2025-01-03 ENCOUNTER — APPOINTMENT (OUTPATIENT)
Dept: ORTHOPEDIC SURGERY | Facility: CLINIC | Age: 70
End: 2025-01-03
Payer: MEDICARE

## 2025-01-03 DIAGNOSIS — S46.011A STRAIN OF MUSCLE(S) AND TENDON(S) OF THE ROTATOR CUFF OF RIGHT SHOULDER, INITIAL ENCOUNTER: ICD-10-CM

## 2025-01-03 DIAGNOSIS — M25.511 PAIN IN RIGHT SHOULDER: ICD-10-CM

## 2025-01-03 DIAGNOSIS — M75.51 BURSITIS OF RIGHT SHOULDER: ICD-10-CM

## 2025-01-03 PROCEDURE — 20610 DRAIN/INJ JOINT/BURSA W/O US: CPT | Mod: RT

## 2025-01-03 PROCEDURE — 73010 X-RAY EXAM OF SHOULDER BLADE: CPT | Mod: RT

## 2025-01-03 PROCEDURE — 99204 OFFICE O/P NEW MOD 45 MIN: CPT | Mod: 25,57

## 2025-01-03 PROCEDURE — 73030 X-RAY EXAM OF SHOULDER: CPT | Mod: RT

## 2025-02-22 ENCOUNTER — RESULT REVIEW (OUTPATIENT)
Age: 70
End: 2025-02-22

## 2025-02-24 ENCOUNTER — APPOINTMENT (OUTPATIENT)
Dept: ORTHOPEDIC SURGERY | Facility: CLINIC | Age: 70
End: 2025-02-24
Payer: MEDICARE

## 2025-02-24 DIAGNOSIS — M25.511 PAIN IN RIGHT SHOULDER: ICD-10-CM

## 2025-02-24 DIAGNOSIS — M75.121 COMPLETE ROTATOR CUFF TEAR OR RUPTURE OF RIGHT SHOULDER, NOT SPECIFIED AS TRAUMATIC: ICD-10-CM

## 2025-02-24 DIAGNOSIS — M75.51 BURSITIS OF RIGHT SHOULDER: ICD-10-CM

## 2025-02-24 PROCEDURE — 99214 OFFICE O/P EST MOD 30 MIN: CPT | Mod: 25

## 2025-02-24 PROCEDURE — 20611 DRAIN/INJ JOINT/BURSA W/US: CPT | Mod: RT
